# Patient Record
Sex: FEMALE | Race: BLACK OR AFRICAN AMERICAN | NOT HISPANIC OR LATINO | URBAN - METROPOLITAN AREA
[De-identification: names, ages, dates, MRNs, and addresses within clinical notes are randomized per-mention and may not be internally consistent; named-entity substitution may affect disease eponyms.]

---

## 2017-05-24 ENCOUNTER — OUTPATIENT (OUTPATIENT)
Dept: OUTPATIENT SERVICES | Facility: HOSPITAL | Age: 72
LOS: 1 days | End: 2017-05-24
Payer: MEDICARE

## 2017-05-24 DIAGNOSIS — R06.02 SHORTNESS OF BREATH: ICD-10-CM

## 2017-05-24 PROCEDURE — 93017 CV STRESS TEST TRACING ONLY: CPT

## 2017-05-24 PROCEDURE — A9500: CPT

## 2017-05-24 PROCEDURE — 78452 HT MUSCLE IMAGE SPECT MULT: CPT

## 2017-05-24 PROCEDURE — A9505: CPT

## 2023-04-12 PROBLEM — Z00.00 ENCOUNTER FOR PREVENTIVE HEALTH EXAMINATION: Status: ACTIVE | Noted: 2023-04-12

## 2023-04-30 ENCOUNTER — NON-APPOINTMENT (OUTPATIENT)
Age: 78
End: 2023-04-30

## 2023-05-01 ENCOUNTER — NON-APPOINTMENT (OUTPATIENT)
Age: 78
End: 2023-05-01

## 2023-05-01 ENCOUNTER — APPOINTMENT (OUTPATIENT)
Dept: NEPHROLOGY | Facility: CLINIC | Age: 78
End: 2023-05-01
Payer: MEDICARE

## 2023-05-01 VITALS
HEIGHT: 64 IN | WEIGHT: 195 LBS | BODY MASS INDEX: 33.29 KG/M2 | SYSTOLIC BLOOD PRESSURE: 138 MMHG | DIASTOLIC BLOOD PRESSURE: 77 MMHG | HEART RATE: 76 BPM

## 2023-05-01 DIAGNOSIS — E66.01 MORBID (SEVERE) OBESITY DUE TO EXCESS CALORIES: ICD-10-CM

## 2023-05-01 DIAGNOSIS — E03.9 HYPOTHYROIDISM, UNSPECIFIED: ICD-10-CM

## 2023-05-01 DIAGNOSIS — E78.00 PURE HYPERCHOLESTEROLEMIA, UNSPECIFIED: ICD-10-CM

## 2023-05-01 DIAGNOSIS — Z83.3 FAMILY HISTORY OF DIABETES MELLITUS: ICD-10-CM

## 2023-05-01 PROCEDURE — 99204 OFFICE O/P NEW MOD 45 MIN: CPT

## 2023-05-01 NOTE — ASSESSMENT
[FreeTextEntry1] : 77-year-old woman who presents with stage IIIa CKD in the setting of borderline BP and type 2 diabetes with apparent neuropathy -not sure if she has microalbuminuria yet.  She does have secondary hyperparathyroidism, so I am starting calcitriol 0.25 mcg daily.  I have ordered a renal ultrasound to assess kidney size, echogenicity, and rule out obstructive uropathy.  I ordered labs to include BMP, Cystatin C, U ACR, urinalysis, PTH.  I am frankly mystified by her highly elevated TSH, when she is apparently faithfully taking thyroid replacement and in no way appears hypothyroid.  She will return in 4 to 5-months.

## 2023-05-01 NOTE — HISTORY OF PRESENT ILLNESS
[FreeTextEntry1] : 77-year-old woman referred by Dr. Schuster, because of an elevated creatinine and reduced GFR.  Her creatinine has risen over the last 5 years from 0.94 up to 1.15, and GFR has fallen from 65 down to 50.  Her K is 4.5, CO2 30, calcium 9.8, hemoglobin 12.5, cholesterol of 282, , TSH is 104, despite being on L-thyroxine 137 mcg daily, taken faithfully.  She has a history of prediabetes dating back 16 years, with neuropathy affecting the toes.  She is on metformin for the last few years.  She has mild left flank discomfort but no hematuria and urine is not foamy or bubbly.  Her BP was 126/76 in March when she saw Dr. HAIDER

## 2023-05-01 NOTE — CONSULT LETTER
[Dear  ___] : Dear  [unfilled], [Consult Letter:] : I had the pleasure of evaluating your patient, [unfilled]. [Please see my note below.] : Please see my note below. [Consult Closing:] : Thank you very much for allowing me to participate in the care of this patient.  If you have any questions, please do not hesitate to contact me. [Sincerely,] : Sincerely, [FreeTextEntry2] : Dr Schuster [FreeTextEntry3] : Sincerely, \par \par Lance Blandon MD, FACP\par

## 2023-05-02 LAB
ANION GAP SERPL CALC-SCNC: 12 MMOL/L
APPEARANCE: CLEAR
BACTERIA: NEGATIVE /HPF
BILIRUBIN URINE: NEGATIVE
BLOOD URINE: NEGATIVE
BUN SERPL-MCNC: 13 MG/DL
CALCIUM SERPL-MCNC: 10.4 MG/DL
CALCIUM SERPL-MCNC: 10.4 MG/DL
CAST: 0 /LPF
CHLORIDE SERPL-SCNC: 106 MMOL/L
CO2 SERPL-SCNC: 27 MMOL/L
COLOR: YELLOW
CREAT SERPL-MCNC: 1.01 MG/DL
CREAT SPEC-SCNC: 141 MG/DL
CYSTATIN C SERPL-MCNC: 1.1 MG/L
EGFR: 58 ML/MIN/1.73M2
EPITHELIAL CELLS: 2 /HPF
GFR/BSA.PRED SERPLBLD CYS-BASED-ARV: 60 ML/MIN/1.73M2
GLUCOSE QUALITATIVE U: NEGATIVE MG/DL
GLUCOSE SERPL-MCNC: 126 MG/DL
KETONES URINE: NEGATIVE MG/DL
LEUKOCYTE ESTERASE URINE: ABNORMAL
MICROALBUMIN 24H UR DL<=1MG/L-MCNC: 1.3 MG/DL
MICROALBUMIN/CREAT 24H UR-RTO: 9 MG/G
MICROSCOPIC-UA: NORMAL
NITRITE URINE: NEGATIVE
PARATHYROID HORMONE INTACT: 50 PG/ML
PH URINE: 5.5
POTASSIUM SERPL-SCNC: 4.2 MMOL/L
PROTEIN URINE: NEGATIVE MG/DL
RED BLOOD CELLS URINE: 0 /HPF
REVIEW: NORMAL
SODIUM SERPL-SCNC: 145 MMOL/L
SPECIFIC GRAVITY URINE: 1.02
UROBILINOGEN URINE: 0.2 MG/DL
WHITE BLOOD CELLS URINE: 3 /HPF

## 2023-09-25 ENCOUNTER — APPOINTMENT (OUTPATIENT)
Dept: NEPHROLOGY | Facility: CLINIC | Age: 78
End: 2023-09-25
Payer: MEDICARE

## 2023-09-25 VITALS
WEIGHT: 195 LBS | HEIGHT: 64 IN | SYSTOLIC BLOOD PRESSURE: 126 MMHG | DIASTOLIC BLOOD PRESSURE: 83 MMHG | BODY MASS INDEX: 33.29 KG/M2

## 2023-09-25 PROCEDURE — 99214 OFFICE O/P EST MOD 30 MIN: CPT

## 2023-10-02 ENCOUNTER — APPOINTMENT (OUTPATIENT)
Dept: ORTHOPEDIC SURGERY | Facility: CLINIC | Age: 78
End: 2023-10-02
Payer: MEDICARE

## 2023-10-02 ENCOUNTER — OUTPATIENT (OUTPATIENT)
Dept: OUTPATIENT SERVICES | Facility: HOSPITAL | Age: 78
LOS: 1 days | End: 2023-10-02
Payer: MEDICARE

## 2023-10-02 ENCOUNTER — RESULT REVIEW (OUTPATIENT)
Age: 78
End: 2023-10-02

## 2023-10-02 VITALS
WEIGHT: 194 LBS | HEART RATE: 63 BPM | OXYGEN SATURATION: 98 % | HEIGHT: 64 IN | BODY MASS INDEX: 33.12 KG/M2 | DIASTOLIC BLOOD PRESSURE: 96 MMHG | SYSTOLIC BLOOD PRESSURE: 159 MMHG

## 2023-10-02 DIAGNOSIS — G89.29 LOW BACK PAIN, UNSPECIFIED: ICD-10-CM

## 2023-10-02 DIAGNOSIS — Z78.9 OTHER SPECIFIED HEALTH STATUS: ICD-10-CM

## 2023-10-02 DIAGNOSIS — M54.50 LOW BACK PAIN, UNSPECIFIED: ICD-10-CM

## 2023-10-02 DIAGNOSIS — Z72.3 LACK OF PHYSICAL EXERCISE: ICD-10-CM

## 2023-10-02 PROCEDURE — 99203 OFFICE O/P NEW LOW 30 MIN: CPT

## 2023-10-02 PROCEDURE — 72100 X-RAY EXAM L-S SPINE 2/3 VWS: CPT

## 2023-10-02 PROCEDURE — 72082 X-RAY EXAM ENTIRE SPI 2/3 VW: CPT

## 2023-10-02 PROCEDURE — 72084 X-RAY EXAM ENTIRE SPI 6/> VW: CPT | Mod: 26

## 2023-10-02 PROCEDURE — 72084 X-RAY EXAM ENTIRE SPI 6/> VW: CPT

## 2023-10-04 PROBLEM — Z78.9 CURRENT NON-DRINKER OF ALCOHOL: Status: ACTIVE | Noted: 2023-10-04

## 2023-10-04 PROBLEM — Z72.3 DOES NOT EXERCISE: Status: ACTIVE | Noted: 2023-10-04

## 2023-10-04 RX ORDER — METFORMIN HYDROCHLORIDE 500 MG/1
500 TABLET, COATED ORAL
Refills: 0 | Status: ACTIVE | COMMUNITY

## 2024-03-12 ENCOUNTER — APPOINTMENT (OUTPATIENT)
Dept: VASCULAR SURGERY | Facility: CLINIC | Age: 79
End: 2024-03-12
Payer: MEDICARE

## 2024-03-12 VITALS
BODY MASS INDEX: 32.1 KG/M2 | HEART RATE: 64 BPM | DIASTOLIC BLOOD PRESSURE: 80 MMHG | HEIGHT: 64 IN | SYSTOLIC BLOOD PRESSURE: 125 MMHG | WEIGHT: 188 LBS

## 2024-03-12 DIAGNOSIS — M79.604 PAIN IN RIGHT LEG: ICD-10-CM

## 2024-03-12 DIAGNOSIS — I73.9 PERIPHERAL VASCULAR DISEASE, UNSPECIFIED: ICD-10-CM

## 2024-03-12 PROCEDURE — 99204 OFFICE O/P NEW MOD 45 MIN: CPT | Mod: 25

## 2024-03-12 PROCEDURE — 93926 LOWER EXTREMITY STUDY: CPT

## 2024-03-12 RX ORDER — ATORVASTATIN CALCIUM 40 MG/1
40 TABLET, FILM COATED ORAL
Refills: 0 | Status: ACTIVE | COMMUNITY

## 2024-03-12 RX ORDER — LEVOTHYROXINE SODIUM 137 UG/1
137 CAPSULE ORAL
Refills: 0 | Status: ACTIVE | COMMUNITY

## 2024-03-12 RX ORDER — EZETIMIBE 10 MG/1
10 TABLET ORAL
Refills: 0 | Status: ACTIVE | COMMUNITY

## 2024-03-12 RX ORDER — LOSARTAN POTASSIUM 50 MG/1
50 TABLET, FILM COATED ORAL
Refills: 0 | Status: ACTIVE | COMMUNITY

## 2024-03-12 NOTE — PROCEDURE
[FreeTextEntry1] : RLE arterial duplex performed to evaluate for LE perfusion demonstrates widely patent arteries throughout the RLE, no findings of thrombus.

## 2024-03-12 NOTE — ADDENDUM
[FreeTextEntry1] : This note was written by Carlos Strauss, acting as a scribe for Dr. Ellis Ramirez.  I, Dr. Ellis Ramirez, have read and attest that all the information, medical decision-making, and discharge instructions within are true and accurate.  I, Dr. Ellis Ramirez, personally performed the evaluation and management (E/M) services for this new patient.  That E/M includes conducting the initial examination, assessing all conditions, and establishing the plan of care.  Today, my ACP, Carlos Strauss, was here to observe my evaluation and management services for this patient to be followed going forward.

## 2024-03-12 NOTE — PHYSICAL EXAM
[JVD] : no jugular venous distention  [Respiratory Effort] : normal respiratory effort [Normal Rate and Rhythm] : normal rate and rhythm [2+] : right 2+ [No Rash or Lesion] : No rash or lesion [Ankle Swelling (On Exam)] : not present [Petechiae] : no petechiae [Purpura] : no purpura  [Alert] : alert [Skin Ulcer] : no ulcer [Skin Induration] : no induration [de-identified] : Healthy, NAD, ambulates well w/o assistance [Calm] : calm [de-identified] : NC/AT, anicteric [de-identified] : FROM throughout, strength 5/5x4, no palpable cords in legs

## 2024-03-12 NOTE — HISTORY OF PRESENT ILLNESS
[FreeTextEntry1] : 78yoF w/PMHx of HTN, [id number] (diagnosed 3y prior), CRI, hypothyroidism, leg swelling, NO smoking hx, chronic low back pain, referred by Dr. Mickey Jaquez for evaluation of her RLE pain when walking.  Pt denies rest pain in the feet/toes but reports pain throughout the RLE when she walks longer distances; pain does originate in the low back and radiates down her leg.  She has not undergone any treatment for this issue to date, denies vascular interventions on the legs in the past.  She is compliant w/all meds including DM meds and statin.

## 2024-03-12 NOTE — ASSESSMENT
[FreeTextEntry1] : 78yoF w/PMHx of HTN, [id number] (diagnosed 3y prior), CRI, hypothyroidism, leg swelling, NO smoking hx, chronic low back pain, referred by Dr. Mickey Jaquez for evaluation of her RLE pain when walking.  Pt denies rest pain in the feet/toes but reports pain throughout the RLE when she walks longer distances; pain does originate in the low back and radiates down her leg.  She has not undergone any treatment for this issue to date, denies vascular interventions on the legs in the past.  She is compliant w/all meds including DM meds and statin.  Legs well-perfused w/bounding pulses in both feet and RLE arterial duplex performed to evaluate for LE perfusion demonstrates widely patent arteries throughout the RLE, no findings of thrombus.  Reassured pt that there is no vascular cause for her symptoms, recommend she continue medical management of her chronic issues and f/u w/Dr. Jaquez for f/u.

## 2024-03-25 ENCOUNTER — APPOINTMENT (OUTPATIENT)
Dept: NEPHROLOGY | Facility: CLINIC | Age: 79
End: 2024-03-25
Payer: MEDICARE

## 2024-03-25 VITALS
WEIGHT: 188 LBS | SYSTOLIC BLOOD PRESSURE: 132 MMHG | BODY MASS INDEX: 32.1 KG/M2 | HEIGHT: 64 IN | DIASTOLIC BLOOD PRESSURE: 73 MMHG

## 2024-03-25 DIAGNOSIS — N18.31 CHRONIC KIDNEY DISEASE, STAGE 3A: ICD-10-CM

## 2024-03-25 DIAGNOSIS — I10 ESSENTIAL (PRIMARY) HYPERTENSION: ICD-10-CM

## 2024-03-25 DIAGNOSIS — E11.9 TYPE 2 DIABETES MELLITUS W/OUT COMPLICATIONS: ICD-10-CM

## 2024-03-25 DIAGNOSIS — N25.81 SECONDARY HYPERPARATHYROIDISM OF RENAL ORIGIN: ICD-10-CM

## 2024-03-25 PROCEDURE — 99214 OFFICE O/P EST MOD 30 MIN: CPT

## 2024-03-25 PROCEDURE — G2211 COMPLEX E/M VISIT ADD ON: CPT

## 2024-03-25 NOTE — HISTORY OF PRESENT ILLNESS
[FreeTextEntry1] : 78-year-old woman with a history of hypertension, type 2 diabetes, stage IIIa CKD, secondary hyperparathyroidism, followed by Dr. Mickey Jaquez, at [facility].  She was seen there on January 30, with a BP of 130/84.  Labs at that time showed stable kidney function with a creatinine of 1.07, GFR 54, K4.1, CO2 30, A1c 5.9, hemoglobin 12.6.  She saw vascular surgery here 2 weeks ago, because of pain radiating down the right leg, clearly related to a disc issue.  Her legs are well-perfused.  She does complain of pain in the left groin area, possibly indicating arthritis in the left hip.  She will see Dr. Jaquez in 2 weeks.  Otherwise she feels relatively well.  She takes her meds faithfully.

## 2024-03-25 NOTE — PHYSICAL EXAM
[General Appearance - Alert] : alert [General Appearance - In No Acute Distress] : in no acute distress [Sclera] : the sclera and conjunctiva were normal [PERRL With Normal Accommodation] : pupils were equal in size, round, and reactive to light [Outer Ear] : the ears and nose were normal in appearance [Neck Appearance] : the appearance of the neck was normal [Jugular Venous Distention Increased] : there was no jugular-venous distention [Neck Cervical Mass (___cm)] : no neck mass was observed [Auscultation Breath Sounds / Voice Sounds] : lungs were clear to auscultation bilaterally [Heart Rate And Rhythm] : heart rate was normal and rhythm regular [Heart Sounds] : normal S1 and S2 [Heart Sounds Gallop] : no gallops [Murmurs] : no murmurs [Heart Sounds Pericardial Friction Rub] : no pericardial rub [Skin Color & Pigmentation] : normal skin color and pigmentation [Skin Turgor] : normal skin turgor [] : no rash [Deep Tendon Reflexes (DTR)] : deep tendon reflexes were 2+ and symmetric [Oriented To Time, Place, And Person] : oriented to person, place, and time [No Focal Deficits] : no focal deficits [Impaired Insight] : insight and judgment were intact [Affect] : the affect was normal

## 2024-03-25 NOTE — ASSESSMENT
[FreeTextEntry1] : 78-year-old woman with stable CKD, well-controlled hypertension and diabetes.  She will return in 6 months, preceded by labs to include UACR, BMP, Cystatin C, PTH.  She will continue losartan.

## 2024-04-21 ENCOUNTER — RX RENEWAL (OUTPATIENT)
Age: 79
End: 2024-04-21

## 2024-04-21 RX ORDER — CALCITRIOL 0.25 UG/1
0.25 CAPSULE, LIQUID FILLED ORAL
Qty: 90 | Refills: 1 | Status: ACTIVE | COMMUNITY
Start: 2023-05-01 | End: 1900-01-01

## 2024-07-19 ENCOUNTER — APPOINTMENT (OUTPATIENT)
Dept: ORTHOPEDIC SURGERY | Facility: CLINIC | Age: 79
End: 2024-07-19
Payer: MEDICARE

## 2024-07-19 VITALS
TEMPERATURE: 98.3 F | BODY MASS INDEX: 32.1 KG/M2 | OXYGEN SATURATION: 98 % | DIASTOLIC BLOOD PRESSURE: 88 MMHG | SYSTOLIC BLOOD PRESSURE: 146 MMHG | WEIGHT: 188 LBS | HEIGHT: 64 IN | HEART RATE: 54 BPM

## 2024-07-19 PROCEDURE — 99213 OFFICE O/P EST LOW 20 MIN: CPT

## 2024-09-23 ENCOUNTER — APPOINTMENT (OUTPATIENT)
Dept: NEPHROLOGY | Facility: CLINIC | Age: 79
End: 2024-09-23
Payer: MEDICARE

## 2024-09-23 VITALS
BODY MASS INDEX: 31.92 KG/M2 | DIASTOLIC BLOOD PRESSURE: 77 MMHG | SYSTOLIC BLOOD PRESSURE: 124 MMHG | WEIGHT: 187 LBS | HEIGHT: 64 IN

## 2024-09-23 DIAGNOSIS — N25.81 SECONDARY HYPERPARATHYROIDISM OF RENAL ORIGIN: ICD-10-CM

## 2024-09-23 DIAGNOSIS — E11.29 TYPE 2 DIABETES MELLITUS WITH OTHER DIABETIC KIDNEY COMPLICATION: ICD-10-CM

## 2024-09-23 DIAGNOSIS — R80.9 TYPE 2 DIABETES MELLITUS WITH OTHER DIABETIC KIDNEY COMPLICATION: ICD-10-CM

## 2024-09-23 DIAGNOSIS — N18.31 CHRONIC KIDNEY DISEASE, STAGE 3A: ICD-10-CM

## 2024-09-23 DIAGNOSIS — E11.9 TYPE 2 DIABETES MELLITUS W/OUT COMPLICATIONS: ICD-10-CM

## 2024-09-23 DIAGNOSIS — I10 ESSENTIAL (PRIMARY) HYPERTENSION: ICD-10-CM

## 2024-09-23 PROCEDURE — G2211 COMPLEX E/M VISIT ADD ON: CPT

## 2024-09-23 PROCEDURE — 99214 OFFICE O/P EST MOD 30 MIN: CPT

## 2024-09-23 RX ORDER — LOSARTAN POTASSIUM 100 MG/1
100 TABLET, FILM COATED ORAL
Qty: 90 | Refills: 1 | Status: ACTIVE | COMMUNITY
Start: 2024-09-23 | End: 1900-01-01

## 2024-09-23 NOTE — ASSESSMENT
[FreeTextEntry1] : 79-year-old woman with stable/improved renal function, but new emergence of microalbuminuria -probably due mainly to a combination of type 2 diabetes and hypertension.  I have asked her to increase losartan to 100 mg daily in an effort to suppress her albuminuria.  She will return in 5 months, preceded by labs to include A1c, UACR, BMP, Cystatin C.

## 2024-09-23 NOTE — HISTORY OF PRESENT ILLNESS
[FreeTextEntry1] : 79-year-old woman referred by Dr. Mickey Jaquez, with a history of hypertension, type 2 diabetes, stage III CKD, secondary hyperparathyroidism.  She was seen there on July 26, and BP was 158/90.  It appeared that she was not taking her losartan 50 mg daily and was advised to do so conscientiously.  Her latest labs from this week show a hemoglobin of 12.2, TSH 0.33, LDL excellent at 62, creatinine better at 0.87, down from 1.07, GFR up to 69, but UACR is elevated for the first time at 59, indicating microalbuminuria probably due to combination of type 2 diabetes and hypertension.  Her leg pain has not improved and no immediate answers have emerged she saw a vascular surgeon.  I have explained that with her appearance of microalbuminuria, we need to increase the dose of losartan up to 100 mg daily.  She is willing.

## 2024-09-23 NOTE — CONSULT LETTER
[Dear  ___] : Dear  [unfilled], [Consult Letter:] : I had the pleasure of evaluating your patient, [unfilled]. [Please see my note below.] : Please see my note below. [Consult Closing:] : Thank you very much for allowing me to participate in the care of this patient.  If you have any questions, please do not hesitate to contact me. [Sincerely,] : Sincerely, [FreeTextEntry2] : Dr Mickey Jaquez [FreeTextEntry3] : Sincerely,   Lance Blandon MD, FACP

## 2024-10-23 ENCOUNTER — RX RENEWAL (OUTPATIENT)
Age: 79
End: 2024-10-23

## 2025-02-20 ENCOUNTER — NON-APPOINTMENT (OUTPATIENT)
Age: 80
End: 2025-02-20

## 2025-02-20 ENCOUNTER — APPOINTMENT (OUTPATIENT)
Dept: NEPHROLOGY | Facility: CLINIC | Age: 80
End: 2025-02-20
Payer: MEDICARE

## 2025-02-20 VITALS
HEIGHT: 64 IN | SYSTOLIC BLOOD PRESSURE: 137 MMHG | DIASTOLIC BLOOD PRESSURE: 82 MMHG | WEIGHT: 191 LBS | BODY MASS INDEX: 32.61 KG/M2

## 2025-02-20 DIAGNOSIS — N18.31 CHRONIC KIDNEY DISEASE, STAGE 3A: ICD-10-CM

## 2025-02-20 DIAGNOSIS — E66.01 MORBID (SEVERE) OBESITY DUE TO EXCESS CALORIES: ICD-10-CM

## 2025-02-20 DIAGNOSIS — N25.81 SECONDARY HYPERPARATHYROIDISM OF RENAL ORIGIN: ICD-10-CM

## 2025-02-20 DIAGNOSIS — E11.29 TYPE 2 DIABETES MELLITUS WITH OTHER DIABETIC KIDNEY COMPLICATION: ICD-10-CM

## 2025-02-20 DIAGNOSIS — I10 ESSENTIAL (PRIMARY) HYPERTENSION: ICD-10-CM

## 2025-02-20 DIAGNOSIS — E11.9 TYPE 2 DIABETES MELLITUS W/OUT COMPLICATIONS: ICD-10-CM

## 2025-02-20 DIAGNOSIS — R80.9 TYPE 2 DIABETES MELLITUS WITH OTHER DIABETIC KIDNEY COMPLICATION: ICD-10-CM

## 2025-02-20 PROCEDURE — 99214 OFFICE O/P EST MOD 30 MIN: CPT

## 2025-02-20 PROCEDURE — G2211 COMPLEX E/M VISIT ADD ON: CPT

## 2025-08-20 ENCOUNTER — APPOINTMENT (OUTPATIENT)
Dept: NEPHROLOGY | Facility: CLINIC | Age: 80
End: 2025-08-20
Payer: MEDICARE

## 2025-08-20 VITALS
HEIGHT: 64 IN | WEIGHT: 190 LBS | SYSTOLIC BLOOD PRESSURE: 128 MMHG | DIASTOLIC BLOOD PRESSURE: 79 MMHG | BODY MASS INDEX: 32.44 KG/M2

## 2025-08-20 DIAGNOSIS — R80.9 PROTEINURIA, UNSPECIFIED: ICD-10-CM

## 2025-08-20 DIAGNOSIS — R80.9 TYPE 2 DIABETES MELLITUS WITH OTHER DIABETIC KIDNEY COMPLICATION: ICD-10-CM

## 2025-08-20 DIAGNOSIS — E11.29 TYPE 2 DIABETES MELLITUS WITH OTHER DIABETIC KIDNEY COMPLICATION: ICD-10-CM

## 2025-08-20 DIAGNOSIS — N18.31 CHRONIC KIDNEY DISEASE, STAGE 3A: ICD-10-CM

## 2025-08-20 DIAGNOSIS — I10 ESSENTIAL (PRIMARY) HYPERTENSION: ICD-10-CM

## 2025-08-20 PROCEDURE — 99214 OFFICE O/P EST MOD 30 MIN: CPT

## 2025-08-20 PROCEDURE — G2211 COMPLEX E/M VISIT ADD ON: CPT
